# Patient Record
Sex: FEMALE | Employment: FULL TIME | URBAN - METROPOLITAN AREA
[De-identification: names, ages, dates, MRNs, and addresses within clinical notes are randomized per-mention and may not be internally consistent; named-entity substitution may affect disease eponyms.]

---

## 2021-06-21 ENCOUNTER — HOSPITAL ENCOUNTER (OUTPATIENT)
Age: 26
Discharge: HOME | End: 2021-06-21

## 2021-06-21 PROCEDURE — 86481 TB AG RESPONSE T-CELL SUSP: CPT

## 2021-06-24 LAB — T-SPOT TB TEST: NORMAL

## 2021-08-24 LAB
CHOLESTEROL/HDL RATIO: 2.2
CHOLESTEROL: 156 MG/DL
GLUCOSE BLD-MCNC: 93 MG/DL (ref 70–99)
HDLC SERPL-MCNC: 71 MG/DL
LDL CHOLESTEROL: 70 MG/DL (ref 0–130)
PATIENT FASTING?: YES
TRIGL SERPL-MCNC: 75 MG/DL
VLDLC SERPL CALC-MCNC: NORMAL MG/DL (ref 1–30)

## 2022-12-28 ENCOUNTER — OFFICE VISIT (OUTPATIENT)
Dept: FAMILY MEDICINE CLINIC | Age: 27
End: 2022-12-28
Payer: COMMERCIAL

## 2022-12-28 VITALS
SYSTOLIC BLOOD PRESSURE: 108 MMHG | WEIGHT: 139 LBS | DIASTOLIC BLOOD PRESSURE: 76 MMHG | TEMPERATURE: 98.8 F | OXYGEN SATURATION: 98 % | RESPIRATION RATE: 18 BRPM | HEART RATE: 90 BPM

## 2022-12-28 DIAGNOSIS — J06.9 ACUTE URI: ICD-10-CM

## 2022-12-28 DIAGNOSIS — J02.9 SORE THROAT: ICD-10-CM

## 2022-12-28 DIAGNOSIS — R50.9 FEVER, UNSPECIFIED FEVER CAUSE: Primary | ICD-10-CM

## 2022-12-28 LAB
INFLUENZA A ANTIBODY: NEGATIVE
INFLUENZA B ANTIBODY: NEGATIVE
S PYO AG THROAT QL: NORMAL

## 2022-12-28 PROCEDURE — 99213 OFFICE O/P EST LOW 20 MIN: CPT | Performed by: REGISTERED NURSE

## 2022-12-28 PROCEDURE — 87880 STREP A ASSAY W/OPTIC: CPT | Performed by: REGISTERED NURSE

## 2022-12-28 PROCEDURE — 87804 INFLUENZA ASSAY W/OPTIC: CPT | Performed by: REGISTERED NURSE

## 2022-12-28 RX ORDER — BENZONATATE 200 MG/1
200 CAPSULE ORAL 3 TIMES DAILY PRN
Qty: 30 CAPSULE | Refills: 0 | Status: SHIPPED | OUTPATIENT
Start: 2022-12-28 | End: 2023-01-07

## 2022-12-28 RX ORDER — AZITHROMYCIN 250 MG/1
TABLET, FILM COATED ORAL
Qty: 6 TABLET | Refills: 0 | Status: SHIPPED | OUTPATIENT
Start: 2022-12-28

## 2022-12-28 SDOH — ECONOMIC STABILITY: FOOD INSECURITY: WITHIN THE PAST 12 MONTHS, THE FOOD YOU BOUGHT JUST DIDN'T LAST AND YOU DIDN'T HAVE MONEY TO GET MORE.: NEVER TRUE

## 2022-12-28 SDOH — ECONOMIC STABILITY: FOOD INSECURITY: WITHIN THE PAST 12 MONTHS, YOU WORRIED THAT YOUR FOOD WOULD RUN OUT BEFORE YOU GOT MONEY TO BUY MORE.: NEVER TRUE

## 2022-12-28 ASSESSMENT — PATIENT HEALTH QUESTIONNAIRE - PHQ9
SUM OF ALL RESPONSES TO PHQ QUESTIONS 1-9: 0
SUM OF ALL RESPONSES TO PHQ QUESTIONS 1-9: 0
SUM OF ALL RESPONSES TO PHQ9 QUESTIONS 1 & 2: 0
SUM OF ALL RESPONSES TO PHQ QUESTIONS 1-9: 0
1. LITTLE INTEREST OR PLEASURE IN DOING THINGS: 0
SUM OF ALL RESPONSES TO PHQ QUESTIONS 1-9: 0
2. FEELING DOWN, DEPRESSED OR HOPELESS: 0

## 2022-12-28 ASSESSMENT — ENCOUNTER SYMPTOMS
WHEEZING: 0
EYES NEGATIVE: 1
COUGH: 1
VOMITING: 0
SORE THROAT: 1
SINUS PRESSURE: 1
SHORTNESS OF BREATH: 1
SINUS PAIN: 0
GASTROINTESTINAL NEGATIVE: 1
NAUSEA: 0

## 2022-12-28 ASSESSMENT — SOCIAL DETERMINANTS OF HEALTH (SDOH): HOW HARD IS IT FOR YOU TO PAY FOR THE VERY BASICS LIKE FOOD, HOUSING, MEDICAL CARE, AND HEATING?: NOT HARD AT ALL

## 2022-12-28 NOTE — PROGRESS NOTES
[address] [facility]  [address] [place] [address]  Dept: [phone]  Dept Fax: [phone]    Leigh Dukes is a 32 y.o. female who presents today for her medical conditions/complaints of   Chief Complaint   Patient presents with    Cough    Congestion    Ear Fullness    Fever     100.2 last night          HPI:     /76   Pulse 90   Temp 98.8 °F (37.1 °C)   Resp 18   Wt 126 lb (57.2 kg)   SpO2 98%     Has taken home COVID19 test yesterday which was negative. Cough  This is a new problem. The current episode started in the past 7 days. The problem has been gradually worsening. The cough is Productive of purulent sputum. Associated symptoms include a fever, nasal congestion, postnasal drip, a sore throat and shortness of breath. Pertinent negatives include no chest pain, ear pain or wheezing. She has tried OTC cough suppressant for the symptoms. The treatment provided no relief. Ear Fullness   There is pain in both ears. Associated symptoms include coughing and a sore throat. Pertinent negatives include no vomiting. Fever   This is a new problem. The problem occurs intermittently. The maximum temperature noted was 102 to 102.9 F. Associated symptoms include congestion, coughing and a sore throat. Pertinent negatives include no chest pain, ear pain, nausea, vomiting or wheezing. She has tried NSAIDs for the symptoms. The treatment provided mild relief. Is able to tolerate PO fluids with no difficulty. No past medical history on file. No past surgical history on file. No family history on file. Social History     Tobacco Use    Smoking status: Never    Smokeless tobacco: Never   Substance Use Topics    Alcohol use: Not on file        Prior to Visit Medications    Medication Sig Taking?  Authorizing Provider   azithromycin (ZITHROMAX) 250 MG tablet 500mg on day 1 followed by 250mg on days 2 - 5 Yes Deidra Del Cid APRN - CNP   benzonatate (TESSALON) 200 MG capsule Take 1 capsule by mouth 3 times daily as needed for Cough Yes Deidra JAZZ Crafto, APRN - CNP       Allergies   Allergen Reactions    Iodides Swelling    Shellfish-Derived Products Anaphylaxis         Subjective:      Review of Systems   Constitutional:  Positive for fever. Negative for activity change and appetite change. HENT:  Positive for congestion, postnasal drip, sinus pressure and sore throat. Negative for ear pain and sinus pain. Eyes: Negative. Respiratory:  Positive for cough and shortness of breath. Negative for wheezing. Cardiovascular:  Negative for chest pain. Gastrointestinal: Negative. Negative for nausea and vomiting. Genitourinary: Negative. Musculoskeletal: Negative. Neurological: Negative. Psychiatric/Behavioral: Negative. Objective:     Physical Exam  Constitutional:       General: She is not in acute distress. Appearance: She is normal weight. She is ill-appearing. She is not toxic-appearing or diaphoretic. HENT:      Head: Normocephalic. Right Ear: Tympanic membrane and ear canal normal.      Left Ear: Tympanic membrane and ear canal normal.      Nose: Nose normal.      Mouth/Throat:      Mouth: Mucous membranes are moist.      Pharynx: Oropharynx is clear. No oropharyngeal exudate or posterior oropharyngeal erythema. Eyes:      Conjunctiva/sclera: Conjunctivae normal.   Cardiovascular:      Rate and Rhythm: Regular rhythm. Tachycardia present. Heart sounds: Normal heart sounds. Pulmonary:      Effort: Pulmonary effort is normal. No respiratory distress. Breath sounds: Normal breath sounds. No wheezing, rhonchi or rales. Skin:     General: Skin is warm. Coloration: Skin is not pale. Neurological:      General: No focal deficit present. Mental Status: She is alert and oriented to person, place, and time.    Psychiatric:         Mood and Affect: Mood normal.         MEDICAL DECISION MAKING Assessment/Plan:     Sisi Kwan was seen today for cough, congestion, ear fullness and fever. Diagnoses and all orders for this visit:    Fever, unspecified fever cause  -     POCT Influenza A/B  -     POCT rapid strep A    Acute URI  -     azithromycin (ZITHROMAX) 250 MG tablet; 500mg on day 1 followed by 250mg on days 2 - 5  -     benzonatate (TESSALON) 200 MG capsule; Take 1 capsule by mouth 3 times daily as needed for Cough    Sore throat  -     POCT rapid strep A    POC strep and POC flu were negative today. Due to symptoms ongoing and worsening over the past week will treat URI with a z-pack and tessalon perles for cough control. Patient appeared non-toxic on physical exam.  Rest, increase fluids. You may take ibuprofen or Tylenol as directed on the bottle for fever, headache, sore throat or pain. Please fill and take the medication as directed on the bottle for the full duration as prescribed. Even if you are feeling better please do not discontinue the medication. If your symptoms worsen over the next 3 days return to the urgent care or follow up with PCP. If you develop any shortness of breath, chest pain or any other emergent concerning symptoms please go to the emergency room. Results for orders placed or performed in visit on 12/28/22   POCT Influenza A/B   Result Value Ref Range    Influenza A Ab Negative     Influenza B Ab Negative    POCT rapid strep A   Result Value Ref Range    Strep A Ag None Detected None Detected       Patient counseled:     Patient given educational materials - see patientinstructions. Discussed use, benefit, and side effects of prescribed medications. All patient questions answered. Pt verbalized understanding. Instructed to continue current medications, diet and exercise. Patient agreed with treatment plan. Follow up as directed.      Electronically signed by MONICA Bates CNP on 12/28/2022 at 11:11 AM

## 2022-12-28 NOTE — LETTER
[facility]  [address]  [facility] [address] [address]  Phone: [phone]  Fax: [phone]    MONICA Bautista CNP        December 28, 2022     Patient: Umu Max   YOB: 1995   Date of Visit: 12/28/2022       To Whom it May Concern:    Umu Max was seen in my clinic on 12/28/2022. She may return to work on 12/29/2022 as long as she is withouth fever with no fever reducing medications . If you have any questions or concerns, please don't hesitate to call.     Sincerely,         MONICA Bautista CNP